# Patient Record
Sex: FEMALE | Race: WHITE | NOT HISPANIC OR LATINO | URBAN - METROPOLITAN AREA
[De-identification: names, ages, dates, MRNs, and addresses within clinical notes are randomized per-mention and may not be internally consistent; named-entity substitution may affect disease eponyms.]

---
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
## 2024-07-18 NOTE — ED PROVIDER NOTE - TEMPLATE, MLM
Left message, per HIPAA, that ok to wait until August to schedule endometrial biopsy . Encourages to call now to schedule.   General